# Patient Record
Sex: MALE | Race: WHITE | NOT HISPANIC OR LATINO | URBAN - METROPOLITAN AREA
[De-identification: names, ages, dates, MRNs, and addresses within clinical notes are randomized per-mention and may not be internally consistent; named-entity substitution may affect disease eponyms.]

---

## 2023-09-30 ENCOUNTER — APPOINTMENT (RX ONLY)
Dept: URBAN - METROPOLITAN AREA CLINIC 184 | Facility: CLINIC | Age: 33
Setting detail: DERMATOLOGY
End: 2023-09-30

## 2023-09-30 DIAGNOSIS — L72.8 OTHER FOLLICULAR CYSTS OF THE SKIN AND SUBCUTANEOUS TISSUE: ICD-10-CM

## 2023-09-30 PROCEDURE — ? COUNSELING

## 2023-09-30 PROCEDURE — 11900 INJECT SKIN LESIONS </W 7: CPT

## 2023-09-30 PROCEDURE — ? INTRALESIONAL KENALOG

## 2023-09-30 ASSESSMENT — LOCATION ZONE DERM: LOCATION ZONE: NECK

## 2023-09-30 ASSESSMENT — LOCATION DETAILED DESCRIPTION DERM: LOCATION DETAILED: RIGHT SUPERIOR ANTERIOR NECK

## 2023-09-30 ASSESSMENT — LOCATION SIMPLE DESCRIPTION DERM: LOCATION SIMPLE: RIGHT ANTERIOR NECK

## 2023-09-30 NOTE — PROCEDURE: INTRALESIONAL KENALOG
Expiration Date (Optional): 09/2024
Concentration Of Solution Injected (Mg/Ml): 10.0
Detail Level: Detailed
Validate Note Data When Using Inventory: Yes
Show Inventory Tab: Hide
How Many Mls Were Removed From The 40 Mg/Ml (5ml) Vial When Preparing The Injectable Solution?: 0
Total Volume Injected (Ccs- Only Use Numbers And Decimals): 0.1
Consent: The risks of atrophy were reviewed with the patient.
Lot # (Optional): [id number]
Kenalog Type Of Vial: Multiple Dose
Medical Necessity Clause: This procedure was medically necessary because the lesions that were treated were:
Kenalog Preparation: Kenalog
Administered By (Optional): BEN
Require Ndc Code?: No
Ndc# For Kenalog Only: [id number]